# Patient Record
Sex: FEMALE | Race: WHITE | NOT HISPANIC OR LATINO | URBAN - METROPOLITAN AREA
[De-identification: names, ages, dates, MRNs, and addresses within clinical notes are randomized per-mention and may not be internally consistent; named-entity substitution may affect disease eponyms.]

---

## 2017-02-22 ENCOUNTER — INPATIENT (INPATIENT)
Facility: HOSPITAL | Age: 42
LOS: 132 days | Discharge: HOME | End: 2017-07-05
Attending: PSYCHIATRY & NEUROLOGY

## 2017-02-22 DIAGNOSIS — F31.74 BIPOLAR DISORDER, IN FULL REMISSION, MOST RECENT EPISODE MANIC: ICD-10-CM

## 2017-07-10 DIAGNOSIS — R60.0 LOCALIZED EDEMA: ICD-10-CM

## 2017-07-10 DIAGNOSIS — F31.2 BIPOLAR DISORDER, CURRENT EPISODE MANIC SEVERE WITH PSYCHOTIC FEATURES: ICD-10-CM

## 2017-07-10 DIAGNOSIS — Z98.82 BREAST IMPLANT STATUS: ICD-10-CM

## 2017-07-10 DIAGNOSIS — R45.1 RESTLESSNESS AND AGITATION: ICD-10-CM

## 2017-07-10 DIAGNOSIS — Z60.9 PROBLEM RELATED TO SOCIAL ENVIRONMENT, UNSPECIFIED: ICD-10-CM

## 2017-07-10 DIAGNOSIS — Z88.6 ALLERGY STATUS TO ANALGESIC AGENT: ICD-10-CM

## 2017-07-10 DIAGNOSIS — R10.9 UNSPECIFIED ABDOMINAL PAIN: ICD-10-CM

## 2017-07-10 DIAGNOSIS — G47.00 INSOMNIA, UNSPECIFIED: ICD-10-CM

## 2017-07-10 DIAGNOSIS — I83.90 ASYMPTOMATIC VARICOSE VEINS OF UNSPECIFIED LOWER EXTREMITY: ICD-10-CM

## 2017-07-10 DIAGNOSIS — J45.909 UNSPECIFIED ASTHMA, UNCOMPLICATED: ICD-10-CM

## 2017-07-10 DIAGNOSIS — K08.89 OTHER SPECIFIED DISORDERS OF TEETH AND SUPPORTING STRUCTURES: ICD-10-CM

## 2017-07-10 DIAGNOSIS — Z88.0 ALLERGY STATUS TO PENICILLIN: ICD-10-CM

## 2017-07-10 DIAGNOSIS — Z63.9 PROBLEM RELATED TO PRIMARY SUPPORT GROUP, UNSPECIFIED: ICD-10-CM

## 2017-07-10 DIAGNOSIS — M46.90 UNSPECIFIED INFLAMMATORY SPONDYLOPATHY, SITE UNSPECIFIED: ICD-10-CM

## 2017-07-10 DIAGNOSIS — Z98.84 BARIATRIC SURGERY STATUS: ICD-10-CM

## 2017-07-10 DIAGNOSIS — Z78.1 PHYSICAL RESTRAINT STATUS: ICD-10-CM

## 2017-07-10 DIAGNOSIS — Z53.29 PROCEDURE AND TREATMENT NOT CARRIED OUT BECAUSE OF PATIENT'S DECISION FOR OTHER REASONS: ICD-10-CM

## 2017-07-10 DIAGNOSIS — Z98.1 ARTHRODESIS STATUS: ICD-10-CM

## 2017-07-10 DIAGNOSIS — M72.2 PLANTAR FASCIAL FIBROMATOSIS: ICD-10-CM

## 2017-07-10 DIAGNOSIS — Z91.14 PATIENT'S OTHER NONCOMPLIANCE WITH MEDICATION REGIMEN: ICD-10-CM

## 2017-07-10 DIAGNOSIS — B35.1 TINEA UNGUIUM: ICD-10-CM

## 2017-07-10 SDOH — SOCIAL STABILITY - SOCIAL INSECURITY: PROBLEM RELATED TO PRIMARY SUPPORT GROUP, UNSPECIFIED: Z63.9

## 2017-07-10 SDOH — SOCIAL STABILITY - SOCIAL INSECURITY: PROBLEM RELATED TO SOCIAL ENVIRONMENT, UNSPECIFIED: Z60.9

## 2017-11-01 PROBLEM — Z00.00 ENCOUNTER FOR PREVENTIVE HEALTH EXAMINATION: Status: ACTIVE | Noted: 2017-11-01

## 2020-03-03 ENCOUNTER — EMERGENCY (EMERGENCY)
Facility: HOSPITAL | Age: 45
LOS: 0 days | Discharge: HOME | End: 2020-03-03
Attending: EMERGENCY MEDICINE | Admitting: EMERGENCY MEDICINE
Payer: COMMERCIAL

## 2020-03-03 VITALS
DIASTOLIC BLOOD PRESSURE: 82 MMHG | HEART RATE: 94 BPM | WEIGHT: 164.91 LBS | OXYGEN SATURATION: 97 % | TEMPERATURE: 98 F | SYSTOLIC BLOOD PRESSURE: 130 MMHG | RESPIRATION RATE: 16 BRPM

## 2020-03-03 DIAGNOSIS — Z98.84 BARIATRIC SURGERY STATUS: Chronic | ICD-10-CM

## 2020-03-03 DIAGNOSIS — Z98.890 OTHER SPECIFIED POSTPROCEDURAL STATES: ICD-10-CM

## 2020-03-03 DIAGNOSIS — R05 COUGH: ICD-10-CM

## 2020-03-03 DIAGNOSIS — J02.9 ACUTE PHARYNGITIS, UNSPECIFIED: ICD-10-CM

## 2020-03-03 DIAGNOSIS — Z88.0 ALLERGY STATUS TO PENICILLIN: ICD-10-CM

## 2020-03-03 PROCEDURE — 99283 EMERGENCY DEPT VISIT LOW MDM: CPT

## 2020-03-03 NOTE — ED PROVIDER NOTE - ATTENDING CONTRIBUTION TO CARE
43yo F presenting with congestion, rhinorrhea, sore throat x Sat. Per pt, returned from [place] on Sat, then developed these sx. No other travel to other countries. no known contact with nCovid positive pts. No fever. No chest pain, shortness of breath, abdominal pain, nausea/vomiting   Constitutional: Well appearing. No acute distress. Non toxic.   Eyes: PERRLA. Extraocular movements intact, no entrapment. Conjunctiva normal.   ENT: No nasal discharge. Moist mucus membranes.  Neck: Supple, non tender, full range of motion.  CV: RRR no murmurs, rubs, or gallops. +S1S2.   Pulm: Clear to auscultation bilaterally. Normal work of breathing.  Abd: soft NT ND +BS.   Ext: Warm and well perfused x4, moving all extremities, no edema.   Psy: Cooperative, appropriate.   Skin: Warm, dry, no rash  Neuro: CN2-12 grossly intact no sensory or motor deficits throughout, no drift, no ataxia

## 2020-03-03 NOTE — ED PROVIDER NOTE - PATIENT PORTAL LINK FT
You can access the FollowMyHealth Patient Portal offered by [facility] by registering at the following website: [email or web address]. By joining [facility]’s FollowMyHealth portal, you will also be able to view your health information using other applications (apps) compatible with our system.

## 2020-03-03 NOTE — ED PROVIDER NOTE - OBJECTIVE STATEMENT
The patient is a 45 yo female with PMHx of bipolar d/o, s/p gastric bypass complaining of cough and runny nose x 2 days. The patient came from [place] for 1 week, came back 3 days ago. 2 days ago, in the morning, felt sick with cough, runny nose, and body aches. No fever, chills, chest pain, SOB, abdominal pain, n/v/d, or dysuria. No sick contacts, or known coronavirus patient contacts. The patient is a 45 yo female with PMHx of bipolar d/o, s/p gastric bypass complaining of cough and runny nose x 2 days. The patient came from [place] for 1 week, came back 3 days ago. 2 days ago, in the morning, felt sick with cough, runny nose, and body aches. No fever, chills, chest pain, SOB, abdominal pain, n/v/d, or dysuria. No sick contacts, or known coronavirus/flu patient contacts. The patient is a 43 yo female with PMHx of bipolar d/o, s/p gastric bypass complaining of cough and runny nose x 2 days. The patient came from [place] for 1 week, came back 3 days ago. 2 days ago, in the morning, felt sick with cough, runny nose, and body aches. No fever, chills, chest pain, SOB, abdominal pain, n/v/d, or dysuria. No sick contacts, or known coronavirus/flu patient contacts. Sx gradual onset mild. Relief with mucinex, aleve

## 2020-03-03 NOTE — ED PROVIDER NOTE - CARE PROVIDER_API CALL
Rick Mclain (DO)  Infectious Disease; Internal Medicine  [address] [place] [address]  Phone: [phone]  Fax: [phone]  Follow Up Time: 7-10 Days

## 2020-03-03 NOTE — ED ADULT TRIAGE NOTE - CHIEF COMPLAINT QUOTE
"I arrived home from the [place] on Saturday. I woke up Sunday morning achy, sneezing and coughing." Pt denies fever, SOB, vomiting.

## 2020-03-03 NOTE — ED PROVIDER NOTE - NSFOLLOWUPINSTRUCTIONS_ED_ALL_ED_FT

## 2020-03-03 NOTE — ED PROVIDER NOTE - CLINICAL SUMMARY MEDICAL DECISION MAKING FREE TEXT BOX
43yo F presenting with congestion, rhinorrhea, sore throat x Sat. Per pt, returned from [place] on Sat, then developed these sx. No other travel to other countries. no known contact with nCovid positive pts. No fever. No chest pain, shortness of breath, abdominal pain, nausea/vomiting. Comfortable with discharge and follow-up outpatient, strict return precautions given. Endorses understanding of all of this and aware that they can return at any time for new or concerning symptoms. No further questions or concerns at this time

## 2020-03-03 NOTE — ED PROVIDER NOTE - PHYSICAL EXAMINATION
CONSTITUTIONAL: Well-developed; well-nourished; in no acute distress.   EYES: no conjunctival injection. PERRL.   ENT: No nasal discharge; airway clear. No erythema or exudates seen.  NECK: Supple; non tender.  CARD: S1, S2 normal; no murmurs, gallops, or rubs. Regular rate and rhythm.   RESP: No wheezes, rales or rhonchi.  ABD: soft ntnd, no rebound tenderness or guarding  EXT: Normal ROM.  No clubbing, cyanosis or edema.   LYMPH: No acute cervical adenopathy.  NEURO: Alert, oriented, grossly unremarkable

## 2020-03-03 NOTE — ED ADULT NURSE NOTE - NSIMPLEMENTINTERV_GEN_ALL_ED
Implemented All Universal Safety Interventions:  [place] to call system. Call bell, personal items and telephone within reach. Instruct patient to call for assistance. Room bathroom lighting operational. Non-slip footwear when patient is off stretcher. Physically safe environment: no spills, clutter or unnecessary equipment. Stretcher in lowest position, wheels locked, appropriate side rails in place.

## 2023-03-16 PROBLEM — F31.9 BIPOLAR DISORDER, UNSPECIFIED: Chronic | Status: ACTIVE | Noted: 2020-03-03

## 2023-03-17 ENCOUNTER — APPOINTMENT (OUTPATIENT)
Dept: ORTHOPEDIC SURGERY | Facility: CLINIC | Age: 48
End: 2023-03-17
Payer: COMMERCIAL

## 2023-03-17 VITALS — HEIGHT: 65 IN | BODY MASS INDEX: 18.83 KG/M2 | WEIGHT: 113 LBS

## 2023-03-17 DIAGNOSIS — M50.90 CERVICAL DISC DISORDER, UNSPECIFIED, UNSPECIFIED CERVICAL REGION: ICD-10-CM

## 2023-03-17 DIAGNOSIS — Z98.1 ARTHRODESIS STATUS: ICD-10-CM

## 2023-03-17 DIAGNOSIS — M53.9 DORSOPATHY, UNSPECIFIED: ICD-10-CM

## 2023-03-17 DIAGNOSIS — M51.9 UNSPECIFIED THORACIC, THORACOLUMBAR AND LUMBOSACRAL INTERVERTEBRAL DISC DISORDER: ICD-10-CM

## 2023-03-17 PROCEDURE — 99203 OFFICE O/P NEW LOW 30 MIN: CPT

## 2023-03-19 PROBLEM — Z98.1 STATUS POST LUMBAR SPINAL FUSION: Status: ACTIVE | Noted: 2023-03-19

## 2023-03-19 PROBLEM — M50.90 CERVICAL DISC DISEASE: Status: ACTIVE | Noted: 2023-03-19

## 2023-03-19 PROBLEM — M51.9 LUMBAR DISC DISEASE: Status: ACTIVE | Noted: 2023-03-19

## 2023-03-19 NOTE — IMAGING
[de-identified] :  pleasant woman slender build easy to examine in no distress\par \par Cervical spine good posture good head position mild spasm in the paraspinal muscles and trapezius muscles guarded motion in all planes tested no come planes of pain on compression\par \par Reflexes upper extremities brisk and symmetric good strength no atrophy\par \par Lumbar healed incision limited flexion extension tight dorsal lumbar fascia and hamstrings no postural scoliosis\par \par Negative straight leg bilaterally reflexes depressed but symmetric normal gait\par \par MRI cervical spine 03/15/2023 no stenosis no herniated discs \par \par CT lumbar spine 03/18/2022 fusion L4-5-S1    mild degenerative changes above the fusion

## 2023-03-19 NOTE — ASSESSMENT
[FreeTextEntry1] :  she continues care at [facility] with her neurologist and therapy    will track down the nerve test reports that she had done at  [facility] based on her reported list of allergies I saw no medications to trial at this time\par  defer to pain management for any injection treatment\par \par Based on her medical history current symptoms physical findings and diagnostics available it is my opinion that she is totally disabled unable to work at like to see her back in a few months

## 2023-03-19 NOTE — HISTORY OF PRESENT ILLNESS
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\par  right handed does not smoke multiple drug allergies\par \par Currently cared for at [facility] has a neurologist \par  did have a lumbar fusion L4-5 S1 2021 by Dr Gottlieb   also has chronic pain in her neck been [facility] has difficulty sleeping due to her neck and back complaints\par  also being monitored for a pituitary tumor   has been diagnosed with tardive dyskinesia\par  she reports endurance sitting 20-30 minutes standing, 20 minutes and walking 5-6  minutes\par  does have some gastrointestinal problems with reflux, ulcers and constipation

## 2023-06-06 ENCOUNTER — APPOINTMENT (OUTPATIENT)
Dept: ORTHOPEDIC SURGERY | Facility: CLINIC | Age: 48
End: 2023-06-06

## 2023-10-25 ENCOUNTER — APPOINTMENT (OUTPATIENT)
Dept: NEUROLOGY | Facility: CLINIC | Age: 48
End: 2023-10-25
Payer: MEDICAID

## 2023-10-25 VITALS
HEIGHT: 65 IN | WEIGHT: 113 LBS | DIASTOLIC BLOOD PRESSURE: 86 MMHG | HEART RATE: 87 BPM | SYSTOLIC BLOOD PRESSURE: 114 MMHG | BODY MASS INDEX: 18.83 KG/M2

## 2023-10-25 DIAGNOSIS — M79.7 FIBROMYALGIA: ICD-10-CM

## 2023-10-25 DIAGNOSIS — Z78.9 OTHER SPECIFIED HEALTH STATUS: ICD-10-CM

## 2023-10-25 DIAGNOSIS — Z87.19 PERSONAL HISTORY OF OTHER DISEASES OF THE DIGESTIVE SYSTEM: ICD-10-CM

## 2023-10-25 DIAGNOSIS — H02.402 UNSPECIFIED PTOSIS OF LEFT EYELID: ICD-10-CM

## 2023-10-25 DIAGNOSIS — E23.6 OTHER DISORDERS OF PITUITARY GLAND: ICD-10-CM

## 2023-10-25 DIAGNOSIS — Z87.898 PERSONAL HISTORY OF OTHER SPECIFIED CONDITIONS: ICD-10-CM

## 2023-10-25 DIAGNOSIS — Z87.09 PERSONAL HISTORY OF OTHER DISEASES OF THE RESPIRATORY SYSTEM: ICD-10-CM

## 2023-10-25 DIAGNOSIS — Z86.59 PERSONAL HISTORY OF OTHER MENTAL AND BEHAVIORAL DISORDERS: ICD-10-CM

## 2023-10-25 DIAGNOSIS — R41.3 OTHER AMNESIA: ICD-10-CM

## 2023-10-25 DIAGNOSIS — R73.03 PREDIABETES.: ICD-10-CM

## 2023-10-25 DIAGNOSIS — Z86.79 PERSONAL HISTORY OF OTHER DISEASES OF THE CIRCULATORY SYSTEM: ICD-10-CM

## 2023-10-25 DIAGNOSIS — Z86.39 PERSONAL HISTORY OF OTHER ENDOCRINE, NUTRITIONAL AND METABOLIC DISEASE: ICD-10-CM

## 2023-10-25 PROCEDURE — 99204 OFFICE O/P NEW MOD 45 MIN: CPT

## 2023-10-25 RX ORDER — LAMOTRIGINE 150 MG/1
TABLET ORAL
Refills: 0 | Status: ACTIVE | COMMUNITY

## 2023-11-15 ENCOUNTER — APPOINTMENT (OUTPATIENT)
Dept: PAIN MANAGEMENT | Facility: CLINIC | Age: 48
End: 2023-11-15

## 2023-11-15 VITALS — BODY MASS INDEX: 18.83 KG/M2 | HEIGHT: 65 IN | WEIGHT: 113 LBS

## 2023-11-15 DIAGNOSIS — Z86.59 PERSONAL HISTORY OF OTHER MENTAL AND BEHAVIORAL DISORDERS: ICD-10-CM

## 2023-11-15 DIAGNOSIS — Z87.39 PERSONAL HISTORY OF OTHER DISEASES OF THE MUSCULOSKELETAL SYSTEM AND CONNECTIVE TISSUE: ICD-10-CM

## 2023-11-15 DIAGNOSIS — I20.89 OTHER FORMS OF ANGINA PECTORIS: ICD-10-CM

## 2023-11-21 ENCOUNTER — APPOINTMENT (OUTPATIENT)
Dept: MRI IMAGING | Facility: CLINIC | Age: 48
End: 2023-11-21
Payer: MEDICAID

## 2023-11-21 PROCEDURE — 70551 MRI BRAIN STEM W/O DYE: CPT

## 2023-11-22 ENCOUNTER — APPOINTMENT (OUTPATIENT)
Dept: NEUROPSYCHOLOGY | Facility: CLINIC | Age: 48
End: 2023-11-22

## 2023-11-22 ENCOUNTER — OUTPATIENT (OUTPATIENT)
Dept: OUTPATIENT SERVICES | Facility: HOSPITAL | Age: 48
LOS: 1 days | End: 2023-11-22
Payer: MEDICAID

## 2023-11-22 DIAGNOSIS — Z98.84 BARIATRIC SURGERY STATUS: Chronic | ICD-10-CM

## 2023-11-22 DIAGNOSIS — G31.84 MILD COGNITIVE IMPAIRMENT OF UNCERTAIN OR UNKNOWN ETIOLOGY: ICD-10-CM

## 2023-11-22 PROCEDURE — 96121 NUBHVL XM PHY/QHP EA ADDL HR: CPT

## 2023-11-22 PROCEDURE — 96116 NUBHVL XM PHYS/QHP 1ST HR: CPT

## 2023-11-23 DIAGNOSIS — G31.84 MILD COGNITIVE IMPAIRMENT OF UNCERTAIN OR UNKNOWN ETIOLOGY: ICD-10-CM

## 2023-12-01 ENCOUNTER — APPOINTMENT (OUTPATIENT)
Dept: NEUROLOGY | Facility: CLINIC | Age: 48
End: 2023-12-01
Payer: MEDICAID

## 2023-12-01 PROCEDURE — 95930 VISUAL EP TEST CNS W/I&R: CPT

## 2023-12-05 ENCOUNTER — APPOINTMENT (OUTPATIENT)
Dept: NEUROPSYCHOLOGY | Facility: CLINIC | Age: 48
End: 2023-12-05

## 2023-12-06 ENCOUNTER — APPOINTMENT (OUTPATIENT)
Dept: NEUROPSYCHOLOGY | Facility: CLINIC | Age: 48
End: 2023-12-06

## 2023-12-07 ENCOUNTER — APPOINTMENT (OUTPATIENT)
Dept: NEUROLOGY | Facility: CLINIC | Age: 48
End: 2023-12-07
Payer: MEDICAID

## 2023-12-07 DIAGNOSIS — E23.6 OTHER DISORDERS OF PITUITARY GLAND: ICD-10-CM

## 2023-12-07 DIAGNOSIS — G43.909 MIGRAINE, UNSPECIFIED, NOT INTRACTABLE, W/OUT STATUS MIGRAINOSUS: ICD-10-CM

## 2023-12-07 PROCEDURE — 99214 OFFICE O/P EST MOD 30 MIN: CPT

## 2024-07-08 ENCOUNTER — NON-APPOINTMENT (OUTPATIENT)
Age: 49
End: 2024-07-08

## 2024-09-09 ENCOUNTER — NON-APPOINTMENT (OUTPATIENT)
Age: 49
End: 2024-09-09

## 2025-01-14 ENCOUNTER — NON-APPOINTMENT (OUTPATIENT)
Age: 50
End: 2025-01-14

## 2025-03-28 NOTE — ED PROVIDER NOTE - NS ED ROS FT
Spoke with patient briefly. States it is not a good time to talk. Agreed to call back on Monday 3/31/25.   Review of Systems:  •	CONSTITUTIONAL - No fever, No diaphoresis, No weight change  •	SKIN - No rash  •	ENT - No change in hearing, No sore throat, No neck pain, + rhinorrhea, No ear pain  •	RESPIRATORY - No shortness of breath, + cough  •	CARDIAC -No chest pain, No palpitations  •	GI - No abdominal pain, No nausea, No vomiting, No diarrhea, No constipation, No bright red blood per rectum or melena. No flank pain  •                 - No dysuria, frequency, hematuria.   •	MUSCULOSKELETAL - No joint paint, No swelling, No back pain, + body aches  All other systems negative, unless specified in HPI

## 2025-04-10 NOTE — ED PROVIDER NOTE - PMH
MEDICATIONS PENDED ARE CORRECT:  Yes    Refill requested for:     Protocol Passed: Yes    Labs Pended: yes, no: No    Medications have been reconciled. Yes    Last visit: 3/31/2025 with: Judy Hamm DO  Upcoming visit: Visit date not found with: Judy Hamm DO     Follow-up needed per last office visit: No follow-up needed at this time    Medication refilled per protocol.       Bipolar disease, chronic

## 2025-04-15 ENCOUNTER — NON-APPOINTMENT (OUTPATIENT)
Age: 50
End: 2025-04-15